# Patient Record
Sex: FEMALE | Race: WHITE | URBAN - METROPOLITAN AREA
[De-identification: names, ages, dates, MRNs, and addresses within clinical notes are randomized per-mention and may not be internally consistent; named-entity substitution may affect disease eponyms.]

---

## 2017-02-15 ENCOUNTER — OFFICE VISIT (OUTPATIENT)
Dept: INTERNAL MEDICINE CLINIC | Age: 22
End: 2017-02-15

## 2017-02-15 VITALS
BODY MASS INDEX: 23.63 KG/M2 | DIASTOLIC BLOOD PRESSURE: 70 MMHG | HEART RATE: 64 BPM | SYSTOLIC BLOOD PRESSURE: 104 MMHG | TEMPERATURE: 98.3 F | HEIGHT: 64 IN | RESPIRATION RATE: 16 BRPM | OXYGEN SATURATION: 98 % | WEIGHT: 138.4 LBS

## 2017-02-15 DIAGNOSIS — N92.1 METRORRHAGIA: ICD-10-CM

## 2017-02-15 DIAGNOSIS — N94.6 DYSMENORRHEA: ICD-10-CM

## 2017-02-15 DIAGNOSIS — L70.8 OTHER ACNE: Primary | ICD-10-CM

## 2017-02-15 RX ORDER — NORGESTIMATE AND ETHINYL ESTRADIOL 0.25-0.035
1 KIT ORAL DAILY
Qty: 4 DOSE PACK | Refills: 1 | Status: SHIPPED | OUTPATIENT
Start: 2017-02-15 | End: 2017-06-23 | Stop reason: SDUPTHER

## 2017-02-15 RX ORDER — NORGESTIMATE AND ETHINYL ESTRADIOL 0.25-0.035
1 KIT ORAL DAILY
Status: CANCELLED | OUTPATIENT
Start: 2017-02-15

## 2017-02-15 RX ORDER — NORGESTIMATE AND ETHINYL ESTRADIOL 0.25-0.035
1 KIT ORAL DAILY
COMMUNITY
End: 2017-02-15 | Stop reason: SDUPTHER

## 2017-02-15 NOTE — PROGRESS NOTES
HISTORY OF PRESENT ILLNESS  Jeffrey Martinez is a 23 y.o. female. Menstrual Problem   This is a recurrent problem. The current episode started more than 1 week ago. Pertinent negatives include no chest pain, no headaches and no shortness of breath. Associated symptoms comments: Metrorrhagia   Denies vaginal discharge, itching,. Treatments tried: Tri Spintec  The treatment provided significant relief. Saw Dr. Iraj Kowalski 2016 and she changed her to [facility] which improved her sx  Not sexually active. SHx: Graduated nursing school      Review of Systems   Constitutional: Negative for diaphoresis, fever and weight loss. Eyes: Negative for blurred vision and pain. Respiratory: Negative for shortness of breath. Cardiovascular: Negative for chest pain, orthopnea and leg swelling. Genitourinary: Positive for menstrual problem. Neurological: Negative for focal weakness and headaches. Psychiatric/Behavioral: Negative for depression. Patient Active Problem List    Diagnosis Date Noted    Acne 07/24/2014       Current Outpatient Prescriptions   Medication Sig Dispense Refill    norgestimate-ethinyl estradiol ([facility] [place] [address],) 0.25-35 mg-mcg tab Take 1 Tab by mouth daily.  fluticasone (FLONASE) 50 mcg/actuation nasal spray 2 Sprays by Right Nostril route daily. 1 Bottle 3    norgestimate-ethinyl estradiol (ORTHO-CYCLEN) 0.25-35 mg-mcg tab Take 1 Tab by mouth daily.  ampicillin (PRINCIPEN) 500 mg capsule Take 500 mg by mouth three (3) times daily. No Known Allergies   Visit Vitals    /70 (BP 1 Location: Right arm, BP Patient Position: Sitting)    Pulse 64    Temp 98.3 °F (36.8 °C) (Oral)    Resp 16    Ht 5' 4\" (1.626 m)    Wt 138 lb 6.4 oz (62.8 kg)    SpO2 98%    BMI 23.76 kg/m2       Physical Exam   Constitutional: She is oriented to person, place, and time. No distress. Cardiovascular: Normal rate, regular rhythm and normal heart sounds.     Pulmonary/Chest: Breath sounds normal. No respiratory distress. She has no wheezes. She has no rales. Neurological: She is alert and oriented to person, place, and time. Psychiatric: She has a normal mood and affect. ASSESSMENT and PLAN  Cliff Hess was seen today for follow-up. She is establishing with new gynecologist. Will provide the name. Traveling extended supply given. Schedule CPE    Diagnoses and all orders for this visit:    Other acne  -     norgestimate-ethinyl estradiol ([address] [place] [address],) 0.25-35 mg-mcg tab; Take 1 Tab by mouth daily.  -     REFERRAL TO GYNECOLOGY    Metrorrhagia  -     norgestimate-ethinyl estradiol ([address] [place] [address],) 0.25-35 mg-mcg tab; Take 1 Tab by mouth daily.  -     REFERRAL TO GYNECOLOGY    Dysmenorrhea  -     norgestimate-ethinyl estradiol ([address] [place] [address],) 0.25-35 mg-mcg tab; Take 1 Tab by mouth daily.  -     REFERRAL TO GYNECOLOGY    Other orders  -     Cancel: norgestimate-ethinyl estradiol ([address] [place] [address],) 0.25-35 mg-mcg tab; Take 1 Tab by mouth daily. Follow-up Disposition:  Return in about 6 months (around 8/15/2017) for Physical - 30 minute appointment. Medication risks/benefits/costs/interactions/alternatives discussed with patient. Jose G Vance  was given an after visit summary which includes diagnoses, current medications, & vitals. she expressed understanding with the diagnosis and plan.

## 2017-02-15 NOTE — MR AVS SNAPSHOT
Visit Information Date & Time Provider Department Dept. Phone Encounter #  
 2/15/2017 11:45 AM Sergio Chan MD [facility] Internal Medicine [phone] [id number] Follow-up Instructions Return in about 6 months (around 8/15/2017) for Physical - 30 minute appointment. Upcoming Health Maintenance Date Due  
 PAP AKA CERVICAL CYTOLOGY 3/20/2018 DTaP/Tdap/Td series (7 - Td) 8/26/2019 Allergies as of 2/15/2017  Review Complete On: 2/15/2017 By: Sergio Chan MD  
 No Known Allergies Current Immunizations  Reviewed on 7/8/2016 Name Date DTaP 3/21/1997, 8/22/1994, 1993, 1993, 1993 HPV 8/26/2009, 8/13/2008, 7/12/2007 Hep A Vaccine 8/13/2008, 7/12/2007 Hep B Vaccine 12/20/1999, 12/17/1998, 11/16/1998 Hib 5/16/1994, 1993, 1993, 1993 Influenza Vaccine 10/11/2015 MMR 3/21/1997, 5/16/1994 Meningococcal Vaccine 6/30/2011, 7/12/2007 Poliovirus vaccine 3/21/1997, 8/22/1994, 1993, 1993 Td 3/5/2004 Tdap 8/26/2009 Varicella Virus Vaccine 1/1/1999 Not reviewed this visit You Were Diagnosed With   
  
 Codes Comments Other acne    -  Primary ICD-10-CM: L70.8 ICD-9-CM: 706.1 Metrorrhagia     ICD-10-CM: N92.1 ICD-9-CM: 626.6 Dysmenorrhea     ICD-10-CM: N94.6 ICD-9-CM: 543. 3 Vitals BP Pulse Temp Resp Height(growth percentile) Weight(growth percentile) 104/70 (BP 1 Location: Right arm, BP Patient Position: Sitting) 64 98.3 °F (36.8 °C) (Oral) 16 5' 4\" (1.626 m) 138 lb 6.4 oz (62.8 kg) LMP SpO2 BMI OB Status Smoking Status 02/07/2017 98% 23.76 kg/m2 Having regular periods Never Smoker Vitals History BMI and BSA Data Body Mass Index Body Surface Area  
 23.76 kg/m 2 1.68 m 2 Preferred Pharmacy Pharmacy Name Phone [id number] [address] [phone] Your Updated Medication List  
  
   
This list is accurate as of: 2/15/17 12:39 PM.  Always use your most recent med list.  
  
  
  
  
 ampicillin 500 mg capsule Commonly known as:  PRINCIPEN Take 500 mg by mouth three (3) times daily. fluticasone 50 mcg/actuation nasal spray Commonly known as:  Racquel Fryer 2 Sprays by Right Nostril route daily. norgestimate-ethinyl estradiol 0.25-35 mg-mcg Tab Commonly known as:  [facility] (28) Take 1 Tab by mouth daily. Prescriptions Sent to Pharmacy Refills  
 norgestimate-ethinyl estradiol ([facility], 28,) 0.25-35 mg-mcg tab 1 Sig: Take 1 Tab by mouth daily. Class: Normal  
 Pharmacy: [id number] [address], [address]  #: [phone] Route: Oral  
  
We Performed the Following REFERRAL TO GYNECOLOGY [REF30 Custom] Follow-up Instructions Return in about 6 months (around 8/15/2017) for Physical - 30 minute appointment. Referral Information Referral ID Referred By Referred To  
  
 [id number] Russell Castro Not Available Visits Status Start Date End Date 1 New Request 2/15/17 2/15/18 If your referral has a status of pending review or denied, additional information will be sent to support the outcome of this decision. Patient Instructions It was a pleasure to see you! As discussed: We will get your labs from Endocrinology Painful Menstrual Cramps: Care Instructions Your Care Instructions Painful menstrual cramps are very common. Many women go to the doctor because of bad cramps when they get their period. You may have cramps in your back, thighs, and belly. You may also have diarrhea, constipation, or nausea. Some women also get dizzy. Pain medicine and home treatment can help you feel better. Follow-up care is a key part of your treatment and safety.  Be sure to make and go to all appointments, and call your doctor if you are having problems. It's also a good idea to know your test results and keep a list of the medicines you take. How can you care for yourself at home? · Take anti-inflammatory medicines for pain. Ibuprofen (Advil, Motrin) and naproxen (Aleve) usually work better than aspirin. ¨ Be safe with medicines. Talk to your doctor or pharmacist before you take any of these medicines. They may not be safe if you take other medicines or have other health problems. ¨ Start taking the recommended dose of pain medicine as soon as you start to feel pain. Or you can start on the day before your period. Keep taking the medicine for as many days as you have cramps. ¨ If anti-inflammatory medicines don't help, try acetaminophen (Tylenol). ¨ Do not take two or more pain medicines at the same time unless the doctor told you to. Many pain medicines have acetaminophen, which is Tylenol. Too much acetaminophen (Tylenol) can be harmful. ¨ Read and follow all instructions on the label. · Put a heating pad set on low or a hot water bottle on your belly. Or take a warm bath. Heat improves blood flow and may help with pain. · Lie down and put a pillow under your knees. Or lie on your side and bring your knees up to your chest. This will help with any back pressure. · Get at least 30 minutes of exercise on most days of the week. This improves blood flow and may decrease pain. Walking is a good choice. You also may want to do other activities, such as running, swimming, cycling, or playing tennis or team sports. When should you call for help? Call 911 anytime you think you may need emergency care. For example, call if: 
· You passed out (lost consciousness). Call your doctor now or seek immediate medical care if: 
· You have sudden, severe pain in your belly or pelvis. · You have severe vaginal bleeding.  This means that you are soaking through your usual pads or tampons every hour for 2 or more hours and passing clots of blood. · You are dizzy or lightheaded, or you feel like you may faint. Watch closely for changes in your health, and be sure to contact your doctor if: 
· You think you may be pregnant. · You have new belly or pelvic pain. · You are taking pain medicine, but it is not helping. · You feel weak and tired. Where can you learn more? Go to http://lázaro-leander.info/. Enter [id number] in the search box to learn more about \"Painful Menstrual Cramps: Care Instructions. \" Current as of: February 25, 2016 Content Version: 11.1 © 6629-5340 [employer]. Care instructions adapted under license by [employer] (which disclaims liability or warranty for this information). If you have questions about a medical condition or this instruction, always ask your healthcare professional. [place] [address] any warranty or liability for your use of this information. Introducing [facility]! Richard Servin introduces [email or web address] patient portal. Now you can access parts of your medical record, email your doctor's office, and request medication refills online. 1. In your internet browser, go to [email or web address] 2. Click on the First Time User? Click Here link in the Sign In box. You will see the New Member Sign Up page. 3. Enter your [email or web address] Access Code exactly as it appears below. You will not need to use this code after youve completed the sign-up process. If you do not sign up before the expiration date, you must request a new code. · [email or web address] Access Code: [email or web address] Expires: 5/16/2017 12:39 PM 
 
4. Enter the last four digits of your Social Security Number (xxxx) and Date of Birth (mm/dd/yyyy) as indicated and click Submit. You will be taken to the next sign-up page. 5. Create a [email or web address] ID.  This will be your [email or web address] login ID and cannot be changed, so think of one that is secure and easy to remember. 6. Create a [facility] password. You can change your password at any time. 7. Enter your Password Reset Question and Answer. This can be used at a later time if you forget your password. 8. Enter your e-mail address. You will receive e-mail notification when new information is available in [address] 9. Click Sign Up. You can now view and download portions of your medical record. 10. Click the Download Summary menu link to download a portable copy of your medical information. If you have questions, please visit the Frequently Asked Questions section of the [employer] website. Remember, [employer] is NOT to be used for urgent needs. For medical emergencies, dial 911. Now available from your iPhone and Android! Please provide this summary of care documentation to your next provider. Your primary care clinician is listed as aRtna Raza. If you have any questions after today's visit, please call [phone].

## 2017-02-15 NOTE — PATIENT INSTRUCTIONS
It was a pleasure to see you! As discussed: We will get your labs from Endocrinology          Painful Menstrual Cramps: Care Instructions  Your Care Instructions    Painful menstrual cramps are very common. Many women go to the doctor because of bad cramps when they get their period. You may have cramps in your back, thighs, and belly. You may also have diarrhea, constipation, or nausea. Some women also get dizzy. Pain medicine and home treatment can help you feel better. Follow-up care is a key part of your treatment and safety. Be sure to make and go to all appointments, and call your doctor if you are having problems. It's also a good idea to know your test results and keep a list of the medicines you take. How can you care for yourself at home? · Take anti-inflammatory medicines for pain. Ibuprofen (Advil, Motrin) and naproxen (Aleve) usually work better than aspirin. ¨ Be safe with medicines. Talk to your doctor or pharmacist before you take any of these medicines. They may not be safe if you take other medicines or have other health problems. ¨ Start taking the recommended dose of pain medicine as soon as you start to feel pain. Or you can start on the day before your period. Keep taking the medicine for as many days as you have cramps. ¨ If anti-inflammatory medicines don't help, try acetaminophen (Tylenol). ¨ Do not take two or more pain medicines at the same time unless the doctor told you to. Many pain medicines have acetaminophen, which is Tylenol. Too much acetaminophen (Tylenol) can be harmful. ¨ Read and follow all instructions on the label. · Put a heating pad set on low or a hot water bottle on your belly. Or take a warm bath. Heat improves blood flow and may help with pain. · Lie down and put a pillow under your knees. Or lie on your side and bring your knees up to your chest. This will help with any back pressure. · Get at least 30 minutes of exercise on most days of the week.  This improves blood flow and may decrease pain. Walking is a good choice. You also may want to do other activities, such as running, swimming, cycling, or playing tennis or team sports. When should you call for help? Call 911 anytime you think you may need emergency care. For example, call if:  · You passed out (lost consciousness). Call your doctor now or seek immediate medical care if:  · You have sudden, severe pain in your belly or pelvis. · You have severe vaginal bleeding. This means that you are soaking through your usual pads or tampons every hour for 2 or more hours and passing clots of blood. · You are dizzy or lightheaded, or you feel like you may faint. Watch closely for changes in your health, and be sure to contact your doctor if:  · You think you may be pregnant. · You have new belly or pelvic pain. · You are taking pain medicine, but it is not helping. · You feel weak and tired. Where can you learn more? Go to http://lázaro-leander.info/. Enter [phone] in the search box to learn more about \"Painful Menstrual Cramps: Care Instructions. \"  Current as of: February 25, 2016  Content Version: 11.1  © 9399-7468 [employer]. Care instructions adapted under license by [employer] (which disclaims liability or warranty for this information). If you have questions about a medical condition or this instruction, always ask your healthcare professional. [place] [address] any warranty or liability for your use of this information.

## 2017-06-23 ENCOUNTER — OFFICE VISIT (OUTPATIENT)
Dept: INTERNAL MEDICINE CLINIC | Age: 22
End: 2017-06-23

## 2017-06-23 VITALS
SYSTOLIC BLOOD PRESSURE: 118 MMHG | HEART RATE: 72 BPM | BODY MASS INDEX: 24.59 KG/M2 | DIASTOLIC BLOOD PRESSURE: 76 MMHG | TEMPERATURE: 97.9 F | WEIGHT: 144 LBS | HEIGHT: 64 IN | OXYGEN SATURATION: 100 % | RESPIRATION RATE: 14 BRPM

## 2017-06-23 DIAGNOSIS — Z00.00 WELL WOMAN EXAM (NO GYNECOLOGICAL EXAM): Primary | ICD-10-CM

## 2017-06-23 DIAGNOSIS — N92.1 METRORRHAGIA: ICD-10-CM

## 2017-06-23 DIAGNOSIS — L70.8 OTHER ACNE: ICD-10-CM

## 2017-06-23 DIAGNOSIS — N94.6 DYSMENORRHEA: ICD-10-CM

## 2017-06-23 RX ORDER — NORGESTIMATE AND ETHINYL ESTRADIOL 0.25-0.035
1 KIT ORAL DAILY
Qty: 4 DOSE PACK | Refills: 1 | Status: SHIPPED | OUTPATIENT
Start: 2017-06-23 | End: 2017-09-11 | Stop reason: SDUPTHER

## 2017-06-23 NOTE — PROGRESS NOTES
Chief Complaint   Patient presents with    Complete Physical     1. Have you been to the ER, urgent care clinic since your last visit? Hospitalized since your last visit? No    2. Have you seen or consulted any other health care providers outside of the [address] [place] [address] since your last visit? Include any pap smears or colon screening.  No

## 2017-06-23 NOTE — PROGRESS NOTES
HISTORY OF PRESENT ILLNESS  Ardienne Ross is a 23 y.o. female. HPI  Health Maintenance   Topic Date Due    INFLUENZA AGE 9 TO ADULT  08/01/2017    PAP AKA CERVICAL CYTOLOGY  03/20/2018    DTaP/Tdap/Td series (7 - Td) 08/26/2019    HPV AGE 9Y-26Y  Completed     Cardiovascular Review: Body mass index is 24.72 kg/(m^2). Diet and Lifestyle: not attempting to follow a low fat, low cholesterol diet, exercises sporadically, nonsmoker  Home BP Monitoring: is not measured at home. Pertinent ROS: no TIA's, no chest pain on exertion, no dyspnea on exertion, no swelling of ankles. Menorrhagia  Sprintec is working   Review of Systems   Constitutional: Negative for chills, fever and weight loss. HENT: Negative for congestion and sore throat. Eyes: Negative for blurred vision and double vision. Respiratory: Negative for cough and shortness of breath. Cardiovascular: Negative for chest pain, orthopnea and leg swelling. Gastrointestinal: Negative for abdominal pain, blood in stool, constipation, diarrhea, heartburn, nausea and vomiting. Genitourinary: Negative for frequency and urgency. Musculoskeletal: Negative for joint pain and myalgias. Neurological: Negative for dizziness, tremors, weakness and headaches. Endo/Heme/Allergies: Does not bruise/bleed easily. Psychiatric/Behavioral: Negative for depression and suicidal ideas. Patient Active Problem List    Diagnosis Date Noted    Acne 07/24/2014       Current Outpatient Prescriptions   Medication Sig Dispense Refill    norgestimate-ethinyl estradiol ([facility] [place] [address],) 0.25-35 mg-mcg tab Take 1 Tab by mouth daily. 4 Dose Pack 1    fluticasone (FLONASE) 50 mcg/actuation nasal spray 2 Sprays by Right Nostril route daily.  1 Bottle 3       No Known Allergies   Visit Vitals    /76 (BP 1 Location: Right arm, BP Patient Position: Sitting)    Pulse 72    Temp 97.9 °F (36.6 °C) (Oral)    Resp 14    Ht 5' 4\" (1.626 m)    Wt 144 lb (65.3 kg)  SpO2 100%    BMI 24.72 kg/m2       Physical Exam   Constitutional: She is oriented to person, place, and time. She appears well-developed and well-nourished. HENT:   Right Ear: External ear normal.   Left Ear: External ear normal.   Mouth/Throat: Oropharynx is clear and moist. No oropharyngeal exudate. Eyes: Conjunctivae are normal. No scleral icterus. Neck: Normal range of motion. Neck supple. No thyromegaly present. Cardiovascular: Normal rate, regular rhythm and normal heart sounds. Exam reveals no gallop and no friction rub. No murmur heard. Pulmonary/Chest: Effort normal and breath sounds normal. No respiratory distress. She has no wheezes. She has no rales. She exhibits no tenderness. Abdominal: Soft. Bowel sounds are normal. She exhibits no distension. There is no tenderness. There is no rebound and no guarding. Genitourinary:   Genitourinary Comments: Deferred for gyn per pt request   Musculoskeletal: Normal range of motion. She exhibits no edema or tenderness. Neurological: She is alert and oriented to person, place, and time. ASSESSMENT and PLAN  Serge Thacker was seen today for complete physical.    Diagnoses and all orders for this visit:    Well woman exam (no gynecological exam)- Health Maintenance reviewed - patient asked to schedule her pap smear. Optimize diet and exercise. -     CBC WITH AUTOMATED DIFF  -     LIPID PANEL  -     METABOLIC PANEL, COMPREHENSIVE  -     THYROID PANEL  -     TSH 3RD GENERATION  -     VITAMIN D, 25 HYDROXY  -     REFERRAL TO OBSTETRICS AND GYNECOLOGY    Other acne  -     norgestimate-ethinyl estradiol ([facility] [place] [address],) 0.25-35 mg-mcg tab; Take 1 Tab by mouth daily. Metrorrhagia  -     REFERRAL TO OBSTETRICS AND GYNECOLOGY  -     norgestimate-ethinyl estradiol ([address] [place] [address],) 0.25-35 mg-mcg tab; Take 1 Tab by mouth daily. Dysmenorrhea  -     norgestimate-ethinyl estradiol ([address] [place] [address],) 0.25-35 mg-mcg tab;  Take 1 Tab by mouth daily.      Follow-up Disposition:  Return in about 1 year (around 6/23/2018) for Physical - 30 minute appointment. Medication risks/benefits/costs/interactions/alternatives discussed with patient. Nilson Yu  was given an after visit summary which includes diagnoses, current medications, & vitals. she expressed understanding with the diagnosis and plan.

## 2017-06-23 NOTE — PATIENT INSTRUCTIONS
It was a pleasure to see you! As discussed:    Health Maintenance   I have ordered your age appropriate labs please complete them. You will need to fast 10-12hrs before your appointment. Start paying more attention to your diet and start exercising. Goal for exercise is 150minutes of moderate exercise a week and diet goal is to eat 50% fruits and vegetables with minimal sugar, fat and oil daily. See health.gov or choosemyplate.gov for more details. Your cervical cancer and breast cancer screening will be completed by your ob/ gyn as scheduled. - 3 month order of Sprintec given till you establish care         Well Visit, Ages 25 to 48: Care Instructions  Your Care Instructions  Physical exams can help you stay healthy. Your doctor has checked your overall health and may have suggested ways to take good care of yourself. He or she also may have recommended tests. At home, you can help prevent illness with healthy eating, regular exercise, and other steps. Follow-up care is a key part of your treatment and safety. Be sure to make and go to all appointments, and call your doctor if you are having problems. It's also a good idea to know your test results and keep a list of the medicines you take. How can you care for yourself at home? · Reach and stay at a healthy weight. This will lower your risk for many problems, such as obesity, diabetes, heart disease, and high blood pressure. · Get at least 30 minutes of physical activity on most days of the week. Walking is a good choice. You also may want to do other activities, such as running, swimming, cycling, or playing tennis or team sports. Discuss any changes in your exercise program with your doctor. · Do not smoke or allow others to smoke around you. If you need help quitting, talk to your doctor about stop-smoking programs and medicines. These can increase your chances of quitting for good.   · Talk to your doctor about whether you have any risk factors for sexually transmitted infections (STIs). Having one sex partner (who does not have STIs and does not have sex with anyone else) is a good way to avoid these infections. · Use birth control if you do not want to have children at this time. Talk with your doctor about the choices available and what might be best for you. · Protect your skin from too much sun. When you're outdoors from 10 a.m. to 4 p.m., stay in the shade or cover up with clothing and a hat with a wide brim. Wear sunglasses that block UV rays. Even when it's cloudy, put broad-spectrum sunscreen (SPF 30 or higher) on any exposed skin. · See a dentist one or two times a year for checkups and to have your teeth cleaned. · Wear a seat belt in the car. · Drink alcohol in moderation, if at all. That means no more than 2 drinks a day for men and 1 drink a day for women. Follow your doctor's advice about when to have certain tests. These tests can spot problems early. For everyone  · Cholesterol. Have the fat (cholesterol) in your blood tested after age 21. Your doctor will tell you how often to have this done based on your age, family history, or other things that can increase your risk for heart disease. · Blood pressure. Have your blood pressure checked during a routine doctor visit. Your doctor will tell you how often to check your blood pressure based on your age, your blood pressure results, and other factors. · Vision. Talk with your doctor about how often to have a glaucoma test.  · Diabetes. Ask your doctor whether you should have tests for diabetes. · Colon cancer. Have a test for colon cancer at age 48. You may have one of several tests. If you are younger than 48, you may need a test earlier if you have any risk factors. Risk factors include whether you already had a precancerous polyp removed from your colon or whether your parent, brother, sister, or child has had colon cancer.   For women  · Breast exam and mammogram. Talk to your doctor about when you should have a clinical breast exam and a mammogram. Medical experts differ on whether and how often women under 50 should have these tests. Your doctor can help you decide what is right for you. · Pap test and pelvic exam. Begin Pap tests at age 24. A Pap test is the best way to find cervical cancer. The test often is part of a pelvic exam. Ask how often to have this test.  · Tests for sexually transmitted infections (STIs). Ask whether you should have tests for STIs. You may be at risk if you have sex with more than one person, especially if your partners do not wear condoms. For men  · Tests for sexually transmitted infections (STIs). Ask whether you should have tests for STIs. You may be at risk if you have sex with more than one person, especially if you do not wear a condom. · Testicular cancer exam. Ask your doctor whether you should check your testicles regularly. · Prostate exam. Talk to your doctor about whether you should have a blood test (called a PSA test) for prostate cancer. Experts differ on whether and when men should have this test. Some experts suggest it if you are older than 39 and are -American or have a father or brother who got prostate cancer when he was younger than 72. When should you call for help? Watch closely for changes in your health, and be sure to contact your doctor if you have any problems or symptoms that concern you. Where can you learn more? Go to http://lázaro-leander.info/. Enter P072 in the search box to learn more about \"Well Visit, Ages 25 to 48: Care Instructions. \"  Current as of: July 19, 2016  Content Version: 11.3  © 9827-6722 [employer]. Care instructions adapted under license by [employer] (which disclaims liability or warranty for this information).  If you have questions about a medical condition or this instruction, always ask your healthcare professional. [address] any warranty or liability for your use of this information.

## 2017-06-23 NOTE — MR AVS SNAPSHOT
Visit Information Date & Time Provider Department Dept. Phone Encounter #  
 6/23/2017  1:30 PM Ely Isbell MD [address] [id number] Internal Medicine [phone] [id number] Follow-up Instructions Return in about 1 year (around 6/23/2018) for Physical - 30 minute appointment. Upcoming Health Maintenance Date Due INFLUENZA AGE 9 TO ADULT 8/1/2017 PAP AKA CERVICAL CYTOLOGY 3/20/2018 DTaP/Tdap/Td series (7 - Td) 8/26/2019 Allergies as of 6/23/2017  Review Complete On: 6/23/2017 By: Ely Isbell MD  
 No Known Allergies Current Immunizations  Reviewed on 7/8/2016 Name Date DTaP 3/21/1997, 8/22/1994, 1993, 1993, 1993 HPV 8/26/2009, 8/13/2008, 7/12/2007 Hep A Vaccine 8/13/2008, 7/12/2007 Hep B Vaccine 12/20/1999, 12/17/1998, 11/16/1998 Hib 5/16/1994, 1993, 1993, 1993 Influenza Vaccine 10/11/2015 MMR 3/21/1997, 5/16/1994 Meningococcal ACWY Vaccine 6/30/2011, 7/12/2007 Poliovirus vaccine 3/21/1997, 8/22/1994, 1993, 1993 Td 3/5/2004 Tdap 8/26/2009 Varicella Virus Vaccine 1/1/1999 Not reviewed this visit You Were Diagnosed With   
  
 Codes Comments Well woman exam (no gynecological exam)    -  Primary ICD-10-CM: Z00.00 ICD-9-CM: V70.0 Other acne     ICD-10-CM: L70.8 ICD-9-CM: 706.1 Metrorrhagia     ICD-10-CM: N92.1 ICD-9-CM: 626.6 Dysmenorrhea     ICD-10-CM: N94.6 ICD-9-CM: 441. 3 Vitals BP Pulse Temp Resp Height(growth percentile) Weight(growth percentile) 118/76 (BP 1 Location: Right arm, BP Patient Position: Sitting) 72 97.9 °F (36.6 °C) (Oral) 14 5' 4\" (1.626 m) 144 lb (65.3 kg) LMP SpO2 BMI OB Status Smoking Status 06/04/2017 100% 24.72 kg/m2 Having regular periods Never Smoker Vitals History BMI and BSA Data Body Mass Index Body Surface Area 24.72 kg/m 2 1.72 m 2 Preferred Pharmacy Pharmacy Name Phone [id number] [address] [phone] Your Updated Medication List  
  
   
This list is accurate as of: 6/23/17  1:57 PM.  Always use your most recent med list.  
  
  
  
  
 fluticasone 50 mcg/actuation nasal spray Commonly known as:  Ranjan Petite 2 Sprays by Right Nostril route daily. norgestimate-ethinyl estradiol 0.25-35 mg-mcg Tab Commonly known as:  [address] (28) Take 1 Tab by mouth daily. Prescriptions Sent to Pharmacy Refills  
 norgestimate-ethinyl estradiol (SPRINTEC, 28,) 0.25-35 mg-mcg tab 1 Sig: Take 1 Tab by mouth daily. Class: Normal  
 Pharmacy: [facility]/pharmacy #[id number] Zain Robby, [address] Ph #: [phone] Route: Oral  
  
We Performed the Following CBC WITH AUTOMATED DIFF [79665 CPT(R)] LIPID PANEL [04487 CPT(R)] METABOLIC PANEL, COMPREHENSIVE [09405 CPT(R)] REFERRAL TO OBSTETRICS AND GYNECOLOGY [REF51 Custom] Comments:  
 Please evaluate patient for well woman THYROID PANEL F250390 CPT(R)] TSH 3RD GENERATION [71883 CPT(R)] VITAMIN D, 25 HYDROXY [id number] CPT(R)] Follow-up Instructions Return in about 1 year (around 6/23/2018) for Physical - 30 minute appointment. Referral Information Referral ID Referred By Referred To  
  
 [id number] [address] M[address] Phone: [phone] Fax: [phone] Visits Status Start Date End Date 1 New Request 6/23/17 6/23/18 If your referral has a status of pending review or denied, additional information will be sent to support the outcome of this decision. Patient Instructions It was a pleasure to see you! As discussed: 
 
Health Maintenance I have ordered your age appropriate labs please complete them. You will need to fast 10-12hrs before your appointment. Start paying more attention to your diet and start exercising. Goal for exercise is 150minutes of moderate exercise a week and diet goal is to eat 50% fruits and vegetables with minimal sugar, fat and oil daily. See health.gov or choosemyplate.gov for more details. Your cervical cancer and breast cancer screening will be completed by your ob/ gyn as scheduled. - 3 month order of Sprintec given till you establish care Well Visit, Ages 25 to 48: Care Instructions Your Care Instructions Physical exams can help you stay healthy. Your doctor has checked your overall health and may have suggested ways to take good care of yourself. He or she also may have recommended tests. At home, you can help prevent illness with healthy eating, regular exercise, and other steps. Follow-up care is a key part of your treatment and safety. Be sure to make and go to all appointments, and call your doctor if you are having problems. It's also a good idea to know your test results and keep a list of the medicines you take. How can you care for yourself at home? · Reach and stay at a healthy weight. This will lower your risk for many problems, such as obesity, diabetes, heart disease, and high blood pressure. · Get at least 30 minutes of physical activity on most days of the week. Walking is a good choice. You also may want to do other activities, such as running, swimming, cycling, or playing tennis or team sports. Discuss any changes in your exercise program with your doctor. · Do not smoke or allow others to smoke around you. If you need help quitting, talk to your doctor about stop-smoking programs and medicines. These can increase your chances of quitting for good. · Talk to your doctor about whether you have any risk factors for sexually transmitted infections (STIs). Having one sex partner (who does not have STIs and does not have sex with anyone else) is a good way to avoid these infections. · Use birth control if you do not want to have children at this time. Talk with your doctor about the choices available and what might be best for you. · Protect your skin from too much sun. When you're outdoors from 10 a.m. to 4 p.m., stay in the shade or cover up with clothing and a hat with a wide brim. Wear sunglasses that block UV rays. Even when it's cloudy, put broad-spectrum sunscreen (SPF 30 or higher) on any exposed skin. · See a dentist one or two times a year for checkups and to have your teeth cleaned. · Wear a seat belt in the car. · Drink alcohol in moderation, if at all. That means no more than 2 drinks a day for men and 1 drink a day for women. Follow your doctor's advice about when to have certain tests. These tests can spot problems early. For everyone · Cholesterol. Have the fat (cholesterol) in your blood tested after age 21. Your doctor will tell you how often to have this done based on your age, family history, or other things that can increase your risk for heart disease. · Blood pressure. Have your blood pressure checked during a routine doctor visit. Your doctor will tell you how often to check your blood pressure based on your age, your blood pressure results, and other factors. · Vision. Talk with your doctor about how often to have a glaucoma test. 
· Diabetes. Ask your doctor whether you should have tests for diabetes. · Colon cancer. Have a test for colon cancer at age 48. You may have one of several tests. If you are younger than 48, you may need a test earlier if you have any risk factors. Risk factors include whether you already had a precancerous polyp removed from your colon or whether your parent, brother, sister, or child has had colon cancer. For women · Breast exam and mammogram. Talk to your doctor about when you should have a clinical breast exam and a mammogram. Medical experts differ on whether and how often women under 50 should have these tests.  Your doctor can help you decide what is right for you. · Pap test and pelvic exam. Begin Pap tests at age 24. A Pap test is the best way to find cervical cancer. The test often is part of a pelvic exam. Ask how often to have this test. 
· Tests for sexually transmitted infections (STIs). Ask whether you should have tests for STIs. You may be at risk if you have sex with more than one person, especially if your partners do not wear condoms. For men · Tests for sexually transmitted infections (STIs). Ask whether you should have tests for STIs. You may be at risk if you have sex with more than one person, especially if you do not wear a condom. · Testicular cancer exam. Ask your doctor whether you should check your testicles regularly. · Prostate exam. Talk to your doctor about whether you should have a blood test (called a PSA test) for prostate cancer. Experts differ on whether and when men should have this test. Some experts suggest it if you are older than 39 and are -American or have a father or brother who got prostate cancer when he was younger than 72. When should you call for help? Watch closely for changes in your health, and be sure to contact your doctor if you have any problems or symptoms that concern you. Where can you learn more? Go to http://lázaro-leander.info/. Enter P072 in the search box to learn more about \"Well Visit, Ages 25 to 48: Care Instructions. \" Current as of: July 19, 2016 Content Version: 11.3 © 3446-2976 [employer]. Care instructions adapted under license by [employer] (which disclaims liability or warranty for this information). If you have questions about a medical condition or this instruction, always ask your healthcare professional. [place] [address] any warranty or liability for your use of this information. Introducing [facility]! Lisa Noble introduces Snapsheet patient portal. Now you can access parts of your medical record, email your doctor's office, and request medication refills online. 1. In your internet browser, go to [email or web address] 2. Click on the First Time User? Click Here link in the Sign In box. You will see the New Member Sign Up page. 3. Enter your Snapsheet Access Code exactly as it appears below. You will not need to use this code after youve completed the sign-up process. If you do not sign up before the expiration date, you must request a new code. · Snapsheet Access Code: [email or web address] Expires: 9/21/2017  1:57 PM 
 
4. Enter the last four digits of your Social Security Number (xxxx) and Date of Birth (mm/dd/yyyy) as indicated and click Submit. You will be taken to the next sign-up page. 5. Create a Snapsheet ID. This will be your Snapsheet login ID and cannot be changed, so think of one that is secure and easy to remember. 6. Create a Snapsheet password. You can change your password at any time. 7. Enter your Password Reset Question and Answer. This can be used at a later time if you forget your password. 8. Enter your e-mail address. You will receive e-mail notification when new information is available in [address] 9. Click Sign Up. You can now view and download portions of your medical record. 10. Click the Download Summary menu link to download a portable copy of your medical information. If you have questions, please visit the Frequently Asked Questions section of the Snapsheet website. Remember, Snapsheet is NOT to be used for urgent needs. For medical emergencies, dial 911. Now available from your iPhone and Android! Please provide this summary of care documentation to your next provider. Your primary care clinician is listed as Kimberly [place]. If you have any questions after today's visit, please call [phone].

## 2017-06-24 LAB
25(OH)D3+25(OH)D2 SERPL-MCNC: 33.4 NG/ML (ref 30–100)
ALBUMIN SERPL-MCNC: 4.2 G/DL (ref 3.5–5.5)
ALBUMIN/GLOB SERPL: 1.6 {RATIO} (ref 1.2–2.2)
ALP SERPL-CCNC: 41 IU/L (ref 39–117)
ALT SERPL-CCNC: 11 IU/L (ref 0–32)
AST SERPL-CCNC: 15 IU/L (ref 0–40)
BASOPHILS # BLD AUTO: 0 X10E3/UL (ref 0–0.2)
BASOPHILS NFR BLD AUTO: 1 %
BILIRUB SERPL-MCNC: 0.4 MG/DL (ref 0–1.2)
BUN SERPL-MCNC: 13 MG/DL (ref 6–20)
BUN/CREAT SERPL: 19 (ref 9–23)
CALCIUM SERPL-MCNC: 9.1 MG/DL (ref 8.7–10.2)
CHLORIDE SERPL-SCNC: 98 MMOL/L (ref 96–106)
CHOLEST SERPL-MCNC: 191 MG/DL (ref 100–199)
CO2 SERPL-SCNC: 23 MMOL/L (ref 18–29)
CREAT SERPL-MCNC: 0.67 MG/DL (ref 0.57–1)
EOSINOPHIL # BLD AUTO: 0.1 X10E3/UL (ref 0–0.4)
EOSINOPHIL NFR BLD AUTO: 1 %
ERYTHROCYTE [DISTWIDTH] IN BLOOD BY AUTOMATED COUNT: 13.2 % (ref 12.3–15.4)
FT4I SERPL CALC-MCNC: 2.3 (ref 1.2–4.9)
GLOBULIN SER CALC-MCNC: 2.6 G/DL (ref 1.5–4.5)
GLUCOSE SERPL-MCNC: 86 MG/DL (ref 65–99)
HCT VFR BLD AUTO: 38.1 % (ref 34–46.6)
HDLC SERPL-MCNC: 80 MG/DL
HGB BLD-MCNC: 12.8 G/DL (ref 11.1–15.9)
IMM GRANULOCYTES # BLD: 0 X10E3/UL (ref 0–0.1)
IMM GRANULOCYTES NFR BLD: 0 %
LDLC SERPL CALC-MCNC: 92 MG/DL (ref 0–99)
LYMPHOCYTES # BLD AUTO: 1.3 X10E3/UL (ref 0.7–3.1)
LYMPHOCYTES NFR BLD AUTO: 22 %
MCH RBC QN AUTO: 30 PG (ref 26.6–33)
MCHC RBC AUTO-ENTMCNC: 33.6 G/DL (ref 31.5–35.7)
MCV RBC AUTO: 89 FL (ref 79–97)
MONOCYTES # BLD AUTO: 0.4 X10E3/UL (ref 0.1–0.9)
MONOCYTES NFR BLD AUTO: 6 %
NEUTROPHILS # BLD AUTO: 4.1 X10E3/UL (ref 1.4–7)
NEUTROPHILS NFR BLD AUTO: 70 %
PLATELET # BLD AUTO: 284 X10E3/UL (ref 150–379)
POTASSIUM SERPL-SCNC: 4.1 MMOL/L (ref 3.5–5.2)
PROT SERPL-MCNC: 6.8 G/DL (ref 6–8.5)
RBC # BLD AUTO: 4.27 X10E6/UL (ref 3.77–5.28)
SODIUM SERPL-SCNC: 137 MMOL/L (ref 134–144)
T3RU NFR SERPL: 24 % (ref 24–39)
T4 SERPL-MCNC: 9.6 UG/DL (ref 4.5–12)
TRIGL SERPL-MCNC: 93 MG/DL (ref 0–149)
TSH SERPL DL<=0.005 MIU/L-ACNC: 1.05 UIU/ML (ref 0.45–4.5)
VLDLC SERPL CALC-MCNC: 19 MG/DL (ref 5–40)
WBC # BLD AUTO: 5.9 X10E3/UL (ref 3.4–10.8)

## 2017-09-11 DIAGNOSIS — N94.6 DYSMENORRHEA: ICD-10-CM

## 2017-09-11 DIAGNOSIS — L70.8 OTHER ACNE: ICD-10-CM

## 2017-09-11 DIAGNOSIS — N92.1 METRORRHAGIA: ICD-10-CM

## 2017-09-11 RX ORDER — NORGESTIMATE AND ETHINYL ESTRADIOL 0.25-0.035
1 KIT ORAL DAILY
Qty: 4 DOSE PACK | Refills: 1 | Status: SHIPPED | OUTPATIENT
Start: 2017-09-11 | End: 2018-12-15

## 2018-12-15 ENCOUNTER — OFFICE VISIT (OUTPATIENT)
Dept: URGENT CARE | Age: 23
End: 2018-12-15

## 2018-12-15 VITALS
TEMPERATURE: 97.3 F | WEIGHT: 153 LBS | HEART RATE: 70 BPM | DIASTOLIC BLOOD PRESSURE: 81 MMHG | OXYGEN SATURATION: 98 % | RESPIRATION RATE: 16 BRPM | HEIGHT: 64 IN | SYSTOLIC BLOOD PRESSURE: 131 MMHG | BODY MASS INDEX: 26.12 KG/M2

## 2018-12-15 DIAGNOSIS — J02.9 SORE THROAT: ICD-10-CM

## 2018-12-15 DIAGNOSIS — J02.9 ACUTE PHARYNGITIS, UNSPECIFIED ETIOLOGY: Primary | ICD-10-CM

## 2018-12-15 LAB
S PYO AG THROAT QL: NEGATIVE
VALID INTERNAL CONTROL?: YES

## 2018-12-16 NOTE — PROGRESS NOTES
Here for sore throat  4/10 intermittent for past 3 days without any other symptoms: no cough, runny nose, nasal congestion  Tolerating food and fluids  No fever or chills  hasnt tried any meds  Overall unchanged. No significant PMH. Non smoker. History reviewed. No pertinent past medical history. History reviewed. No pertinent surgical history. Family History   Problem Relation Age of Onset    Hypertension Father     Diabetes Maternal Grandmother     Cancer Paternal Grandmother         lung        Social History     Socioeconomic History    Marital status: SINGLE     Spouse name: Not on file    Number of children: Not on file    Years of education: Not on file    Highest education level: Not on file   Social Needs    Financial resource strain: Not on file    Food insecurity - worry: Not on file    Food insecurity - inability: Not on file   [employer] needs - medical: Not on file   [employer] needs - non-medical: Not on file   Occupational History    Not on file   Tobacco Use    Smoking status: Never Smoker    Smokeless tobacco: Never Used   Substance and Sexual Activity    Alcohol use: Yes     Alcohol/week: 0.5 oz     Types: 1 Standard drinks or equivalent per week     Comment: wine or beer    Drug use: No    Sexual activity: No   Other Topics Concern    Not on file   Social History Narrative    Not on file                ALLERGIES: Patient has no known allergies. Review of Systems   Constitutional: Negative for chills and fever. Gastrointestinal: Negative for nausea and vomiting. Neurological: Negative for dizziness and weakness. All other systems reviewed and are negative. Vitals:    12/15/18 1824   BP: 131/81   Pulse: 70   Resp: 16   Temp: 97.3 °F (36.3 °C)   SpO2: 98%   Weight: 153 lb (69.4 kg)   Height: 5' 4\" (1.626 m)       Physical Exam   Constitutional: She is oriented to person, place, and time. No distress.    Non-toxic appearing, well hydrated   HENT:   Mild erythema of posterior pharynx wall without any swelling, lesion or exudate. Uvula midline mucus membranes moist. TMs normal.   Eyes: Conjunctivae and EOM are normal. Pupils are equal, round, and reactive to light. No scleral icterus. Neck: Normal range of motion. Neck supple. Cardiovascular: Normal rate, regular rhythm and normal heart sounds. Exam reveals no gallop and no friction rub. No murmur heard. Pulmonary/Chest: Effort normal and breath sounds normal. No respiratory distress. She has no wheezes. She has no rales. Lymphadenopathy:     She has no cervical adenopathy. Neurological: She is alert and oriented to person, place, and time. Skin: Skin is warm and dry. No rash noted. She is not diaphoretic. No erythema. No pallor. Psychiatric: She has a normal mood and affect. Her behavior is normal. Thought content normal.   Nursing note and vitals reviewed. MDM     Differential Diagnosis; Clinical Impression; Plan:       CLINICAL IMPRESSION:  (J02.9) Acute pharyngitis, unspecified etiology  (primary encounter diagnosis)  (J02.9) Sore throat    Orders Placed This Encounter      AMB POC RAPID STREP A      Plan:  Rapid strep negative. No indication for culture  This may be a virus or be caused by post nasal drip.   Zinc lozenges (cold brad)  OTC zyrtec (for post nasal drip)  Soothing fluids  OTC Motrin    Follow up with PCP or urgent care without improvement over next 5-7 days sooner/immediately for new or worsening                Procedures

## 2018-12-16 NOTE — PATIENT INSTRUCTIONS
Rapid strep negative. This may be a virus or be caused by post nasal drip. Zinc lozenges (cold brad)  OTC zyrtec (for post nasal drip)  Soothing fluids  OTC Motrin    Follow up with PCP or urgent care without improvement over next 5-7 days sooner/immediately for new or worsening      Sore Throat: Care Instructions  Your Care Instructions    Infection by bacteria or a virus causes most sore throats. Cigarette smoke, dry air, air pollution, allergies, and yelling can also cause a sore throat. Sore throats can be painful and annoying. Fortunately, most sore throats go away on their own. If you have a bacterial infection, your doctor may prescribe antibiotics. Follow-up care is a key part of your treatment and safety. Be sure to make and go to all appointments, and call your doctor if you are having problems. It's also a good idea to know your test results and keep a list of the medicines you take. How can you care for yourself at home? · If your doctor prescribed antibiotics, take them as directed. Do not stop taking them just because you feel better. You need to take the full course of antibiotics. · Gargle with warm salt water once an hour to help reduce swelling and relieve discomfort. Use 1 teaspoon of salt mixed in 1 cup of warm water. · Take an over-the-counter pain medicine, such as acetaminophen (Tylenol), ibuprofen (Advil, Motrin), or naproxen (Aleve). Read and follow all instructions on the label. · Be careful when taking over-the-counter cold or flu medicines and Tylenol at the same time. Many of these medicines have acetaminophen, which is Tylenol. Read the labels to make sure that you are not taking more than the recommended dose. Too much acetaminophen (Tylenol) can be harmful. · Drink plenty of fluids. Fluids may help soothe an irritated throat. Hot fluids, such as tea or soup, may help decrease throat pain. · Use over-the-counter throat lozenges to soothe pain.  Regular cough drops or hard candy may also help. These should not be given to young children because of the risk of choking. · Do not smoke or allow others to smoke around you. If you need help quitting, talk to your doctor about stop-smoking programs and medicines. These can increase your chances of quitting for good. · Use a vaporizer or humidifier to add moisture to your bedroom. Follow the directions for cleaning the machine. When should you call for help? Call your doctor now or seek immediate medical care if:    · You have new or worse trouble swallowing.     · Your sore throat gets much worse on one side.    Watch closely for changes in your health, and be sure to contact your doctor if you do not get better as expected. Where can you learn more? Go to http://lázaro-leander.info/. Enter 062 [id number] 80 [id number] in the search box to learn more about \"Sore Throat: Care Instructions. \"  Current as of: March 28, 2018  Content Version: 11.8  © 1284-3159 [employer]orporated. Care instructions adapted under license by [employer] (which disclaims liability or warranty for this information). If you have questions about a medical condition or this instruction, always ask your healthcare professional. [address] any warranty or liability for your use of this information.